# Patient Record
Sex: FEMALE | Race: BLACK OR AFRICAN AMERICAN | Employment: PART TIME | URBAN - METROPOLITAN AREA
[De-identification: names, ages, dates, MRNs, and addresses within clinical notes are randomized per-mention and may not be internally consistent; named-entity substitution may affect disease eponyms.]

---

## 2018-08-27 ENCOUNTER — HOSPITAL ENCOUNTER (OUTPATIENT)
Dept: PREADMISSION TESTING | Age: 48
Discharge: HOME | End: 2018-08-27
Payer: COMMERCIAL

## 2018-08-27 ENCOUNTER — ANESTHESIA EVENT (OUTPATIENT)
Dept: SURGERY | Age: 48
End: 2018-08-27
Payer: SUBSIDIZED

## 2018-08-27 LAB
ABO + RH BLD: NORMAL
ANION GAP SERPL CALC-SCNC: 7 MMOL/L (ref 3–18)
BASOPHILS # BLD: 0 K/UL (ref 0–0.1)
BASOPHILS NFR BLD: 0 % (ref 0–2)
BLOOD GROUP ANTIBODIES SERPL: NORMAL
BUN SERPL-MCNC: 12 MG/DL (ref 7–18)
BUN/CREAT SERPL: 14 (ref 12–20)
CALCIUM SERPL-MCNC: 8.7 MG/DL (ref 8.5–10.1)
CHLORIDE SERPL-SCNC: 105 MMOL/L (ref 100–108)
CO2 SERPL-SCNC: 27 MMOL/L (ref 21–32)
CREAT SERPL-MCNC: 0.83 MG/DL (ref 0.6–1.3)
DIFFERENTIAL METHOD BLD: ABNORMAL
EOSINOPHIL # BLD: 0.1 K/UL (ref 0–0.4)
EOSINOPHIL NFR BLD: 2 % (ref 0–5)
ERYTHROCYTE [DISTWIDTH] IN BLOOD BY AUTOMATED COUNT: 14.1 % (ref 11.6–14.5)
GLUCOSE SERPL-MCNC: 90 MG/DL (ref 74–99)
HCG SERPL QL: NEGATIVE
HCT VFR BLD AUTO: 36 % (ref 35–45)
HGB BLD-MCNC: 11.9 G/DL (ref 12–16)
LYMPHOCYTES # BLD: 1.8 K/UL (ref 0.9–3.6)
LYMPHOCYTES NFR BLD: 47 % (ref 21–52)
MCH RBC QN AUTO: 27.7 PG (ref 24–34)
MCHC RBC AUTO-ENTMCNC: 33.1 G/DL (ref 31–37)
MCV RBC AUTO: 83.9 FL (ref 74–97)
MONOCYTES # BLD: 0.4 K/UL (ref 0.05–1.2)
MONOCYTES NFR BLD: 10 % (ref 3–10)
NEUTS SEG # BLD: 1.6 K/UL (ref 1.8–8)
NEUTS SEG NFR BLD: 41 % (ref 40–73)
PLATELET # BLD AUTO: 297 K/UL (ref 135–420)
PMV BLD AUTO: 9.1 FL (ref 9.2–11.8)
POTASSIUM SERPL-SCNC: 4.7 MMOL/L (ref 3.5–5.5)
RBC # BLD AUTO: 4.29 M/UL (ref 4.2–5.3)
SODIUM SERPL-SCNC: 139 MMOL/L (ref 136–145)
SPECIMEN EXP DATE BLD: NORMAL
WBC # BLD AUTO: 3.9 K/UL (ref 4.6–13.2)

## 2018-08-27 PROCEDURE — 36415 COLL VENOUS BLD VENIPUNCTURE: CPT | Performed by: OBSTETRICS & GYNECOLOGY

## 2018-08-27 PROCEDURE — 84703 CHORIONIC GONADOTROPIN ASSAY: CPT | Performed by: OBSTETRICS & GYNECOLOGY

## 2018-08-27 PROCEDURE — 86900 BLOOD TYPING SEROLOGIC ABO: CPT | Performed by: OBSTETRICS & GYNECOLOGY

## 2018-08-27 PROCEDURE — 80048 BASIC METABOLIC PNL TOTAL CA: CPT | Performed by: OBSTETRICS & GYNECOLOGY

## 2018-08-27 PROCEDURE — 85025 COMPLETE CBC W/AUTO DIFF WBC: CPT | Performed by: OBSTETRICS & GYNECOLOGY

## 2018-08-28 ENCOUNTER — HOSPITAL ENCOUNTER (OUTPATIENT)
Age: 48
Setting detail: OUTPATIENT SURGERY
Discharge: HOME | End: 2018-08-28
Attending: OBSTETRICS & GYNECOLOGY | Admitting: OBSTETRICS & GYNECOLOGY
Payer: SUBSIDIZED

## 2018-08-28 ENCOUNTER — ANESTHESIA (OUTPATIENT)
Dept: SURGERY | Age: 48
End: 2018-08-28
Payer: SUBSIDIZED

## 2018-08-28 VITALS
BODY MASS INDEX: 32 KG/M2 | HEIGHT: 60 IN | RESPIRATION RATE: 16 BRPM | OXYGEN SATURATION: 98 % | HEART RATE: 72 BPM | DIASTOLIC BLOOD PRESSURE: 66 MMHG | SYSTOLIC BLOOD PRESSURE: 113 MMHG | WEIGHT: 163 LBS | TEMPERATURE: 98.2 F

## 2018-08-28 DIAGNOSIS — G89.18 POST-OP PAIN: Primary | ICD-10-CM

## 2018-08-28 PROCEDURE — 74011250636 HC RX REV CODE- 250/636

## 2018-08-28 PROCEDURE — 74011000258 HC RX REV CODE- 258: Performed by: OBSTETRICS & GYNECOLOGY

## 2018-08-28 PROCEDURE — 77030020362 HC SOL INJ STRL H2O 1000ML BG LF: Performed by: OBSTETRICS & GYNECOLOGY

## 2018-08-28 PROCEDURE — 76210000026 HC REC RM PH II 1 TO 1.5 HR: Performed by: OBSTETRICS & GYNECOLOGY

## 2018-08-28 PROCEDURE — 77030020782 HC GWN BAIR PAWS FLX 3M -B: Performed by: OBSTETRICS & GYNECOLOGY

## 2018-08-28 PROCEDURE — 77030018832 HC SOL IRR H20 ICUM -A: Performed by: OBSTETRICS & GYNECOLOGY

## 2018-08-28 PROCEDURE — 88305 TISSUE EXAM BY PATHOLOGIST: CPT | Performed by: OBSTETRICS & GYNECOLOGY

## 2018-08-28 PROCEDURE — 77030008683 HC TU ET CUF COVD -A: Performed by: ANESTHESIOLOGY

## 2018-08-28 PROCEDURE — 74011250636 HC RX REV CODE- 250/636: Performed by: OBSTETRICS & GYNECOLOGY

## 2018-08-28 PROCEDURE — 77030038020 HC MANFLD NEPTUNE STRY -B: Performed by: OBSTETRICS & GYNECOLOGY

## 2018-08-28 PROCEDURE — 88112 CYTOPATH CELL ENHANCE TECH: CPT | Performed by: OBSTETRICS & GYNECOLOGY

## 2018-08-28 PROCEDURE — 77030037892: Performed by: OBSTETRICS & GYNECOLOGY

## 2018-08-28 PROCEDURE — 77030018835 HC SOL IRR LR ICUM -A: Performed by: OBSTETRICS & GYNECOLOGY

## 2018-08-28 PROCEDURE — 76210000016 HC OR PH I REC 1 TO 1.5 HR: Performed by: OBSTETRICS & GYNECOLOGY

## 2018-08-28 PROCEDURE — 77030034849: Performed by: OBSTETRICS & GYNECOLOGY

## 2018-08-28 PROCEDURE — 74011250637 HC RX REV CODE- 250/637: Performed by: OBSTETRICS & GYNECOLOGY

## 2018-08-28 PROCEDURE — 77030032490 HC SLV COMPR SCD KNE COVD -B: Performed by: OBSTETRICS & GYNECOLOGY

## 2018-08-28 PROCEDURE — 77030031139 HC SUT VCRL2 J&J -A: Performed by: OBSTETRICS & GYNECOLOGY

## 2018-08-28 PROCEDURE — 77030034154 HC SHR COAG HARM ACE J&J -F: Performed by: OBSTETRICS & GYNECOLOGY

## 2018-08-28 PROCEDURE — 77030008603 HC TRCR ENDOSC EPATH J&J -C: Performed by: OBSTETRICS & GYNECOLOGY

## 2018-08-28 PROCEDURE — 77030039266 HC ADH SKN EXOFIN S2SG -A: Performed by: OBSTETRICS & GYNECOLOGY

## 2018-08-28 PROCEDURE — 77030018673: Performed by: OBSTETRICS & GYNECOLOGY

## 2018-08-28 PROCEDURE — 74011000250 HC RX REV CODE- 250: Performed by: OBSTETRICS & GYNECOLOGY

## 2018-08-28 PROCEDURE — 77030008477 HC STYL SATN SLP COVD -A: Performed by: ANESTHESIOLOGY

## 2018-08-28 PROCEDURE — 74011000250 HC RX REV CODE- 250

## 2018-08-28 PROCEDURE — 76010000131 HC OR TIME 2 TO 2.5 HR: Performed by: OBSTETRICS & GYNECOLOGY

## 2018-08-28 PROCEDURE — 76060000035 HC ANESTHESIA 2 TO 2.5 HR: Performed by: OBSTETRICS & GYNECOLOGY

## 2018-08-28 PROCEDURE — 88307 TISSUE EXAM BY PATHOLOGIST: CPT | Performed by: OBSTETRICS & GYNECOLOGY

## 2018-08-28 PROCEDURE — 77030002933 HC SUT MCRYL J&J -A: Performed by: OBSTETRICS & GYNECOLOGY

## 2018-08-28 PROCEDURE — 74011250636 HC RX REV CODE- 250/636: Performed by: ANESTHESIOLOGY

## 2018-08-28 PROCEDURE — 77030011294 HC FCPS BPLR MCR J&J -B: Performed by: OBSTETRICS & GYNECOLOGY

## 2018-08-28 RX ORDER — ALBUTEROL SULFATE 0.83 MG/ML
2.5 SOLUTION RESPIRATORY (INHALATION) AS NEEDED
Status: DISCONTINUED | OUTPATIENT
Start: 2018-08-28 | End: 2018-08-29 | Stop reason: HOSPADM

## 2018-08-28 RX ORDER — ESMOLOL HYDROCHLORIDE 10 MG/ML
INJECTION INTRAVENOUS AS NEEDED
Status: DISCONTINUED | OUTPATIENT
Start: 2018-08-28 | End: 2018-08-28 | Stop reason: HOSPADM

## 2018-08-28 RX ORDER — LIDOCAINE HYDROCHLORIDE 20 MG/ML
INJECTION, SOLUTION EPIDURAL; INFILTRATION; INTRACAUDAL; PERINEURAL AS NEEDED
Status: DISCONTINUED | OUTPATIENT
Start: 2018-08-28 | End: 2018-08-28 | Stop reason: HOSPADM

## 2018-08-28 RX ORDER — DEXAMETHASONE SODIUM PHOSPHATE 4 MG/ML
INJECTION, SOLUTION INTRA-ARTICULAR; INTRALESIONAL; INTRAMUSCULAR; INTRAVENOUS; SOFT TISSUE AS NEEDED
Status: DISCONTINUED | OUTPATIENT
Start: 2018-08-28 | End: 2018-08-28 | Stop reason: HOSPADM

## 2018-08-28 RX ORDER — SODIUM CHLORIDE 0.9 % (FLUSH) 0.9 %
5-10 SYRINGE (ML) INJECTION AS NEEDED
Status: DISCONTINUED | OUTPATIENT
Start: 2018-08-28 | End: 2018-08-29 | Stop reason: HOSPADM

## 2018-08-28 RX ORDER — FENTANYL CITRATE 50 UG/ML
25 INJECTION, SOLUTION INTRAMUSCULAR; INTRAVENOUS AS NEEDED
Status: DISCONTINUED | OUTPATIENT
Start: 2018-08-28 | End: 2018-08-29 | Stop reason: HOSPADM

## 2018-08-28 RX ORDER — BUPIVACAINE HYDROCHLORIDE 5 MG/ML
INJECTION, SOLUTION EPIDURAL; INTRACAUDAL AS NEEDED
Status: DISCONTINUED | OUTPATIENT
Start: 2018-08-28 | End: 2018-08-28 | Stop reason: HOSPADM

## 2018-08-28 RX ORDER — PHENAZOPYRIDINE HYDROCHLORIDE 100 MG/1
100 TABLET, FILM COATED ORAL ONCE
Status: COMPLETED | OUTPATIENT
Start: 2018-08-28 | End: 2018-08-28

## 2018-08-28 RX ORDER — NEOSTIGMINE METHYLSULFATE 5 MG/5 ML
SYRINGE (ML) INTRAVENOUS AS NEEDED
Status: DISCONTINUED | OUTPATIENT
Start: 2018-08-28 | End: 2018-08-28 | Stop reason: HOSPADM

## 2018-08-28 RX ORDER — NALOXONE HYDROCHLORIDE 0.4 MG/ML
0.2 INJECTION, SOLUTION INTRAMUSCULAR; INTRAVENOUS; SUBCUTANEOUS AS NEEDED
Status: DISCONTINUED | OUTPATIENT
Start: 2018-08-28 | End: 2018-08-29 | Stop reason: HOSPADM

## 2018-08-28 RX ORDER — SODIUM CHLORIDE, SODIUM LACTATE, POTASSIUM CHLORIDE, CALCIUM CHLORIDE 600; 310; 30; 20 MG/100ML; MG/100ML; MG/100ML; MG/100ML
125 INJECTION, SOLUTION INTRAVENOUS CONTINUOUS
Status: DISCONTINUED | OUTPATIENT
Start: 2018-08-28 | End: 2018-08-29 | Stop reason: HOSPADM

## 2018-08-28 RX ORDER — ONDANSETRON 2 MG/ML
INJECTION INTRAMUSCULAR; INTRAVENOUS AS NEEDED
Status: DISCONTINUED | OUTPATIENT
Start: 2018-08-28 | End: 2018-08-28 | Stop reason: HOSPADM

## 2018-08-28 RX ORDER — DIPHENHYDRAMINE HYDROCHLORIDE 50 MG/ML
12.5 INJECTION, SOLUTION INTRAMUSCULAR; INTRAVENOUS
Status: DISCONTINUED | OUTPATIENT
Start: 2018-08-28 | End: 2018-08-29 | Stop reason: HOSPADM

## 2018-08-28 RX ORDER — MAGNESIUM SULFATE 100 %
4 CRYSTALS MISCELLANEOUS AS NEEDED
Status: DISCONTINUED | OUTPATIENT
Start: 2018-08-28 | End: 2018-08-29 | Stop reason: HOSPADM

## 2018-08-28 RX ORDER — GLYCOPYRROLATE 0.2 MG/ML
INJECTION INTRAMUSCULAR; INTRAVENOUS AS NEEDED
Status: DISCONTINUED | OUTPATIENT
Start: 2018-08-28 | End: 2018-08-28 | Stop reason: HOSPADM

## 2018-08-28 RX ORDER — FENTANYL CITRATE 50 UG/ML
INJECTION, SOLUTION INTRAMUSCULAR; INTRAVENOUS AS NEEDED
Status: DISCONTINUED | OUTPATIENT
Start: 2018-08-28 | End: 2018-08-28 | Stop reason: HOSPADM

## 2018-08-28 RX ORDER — KETOROLAC TROMETHAMINE 10 MG/1
10 TABLET, FILM COATED ORAL
Qty: 20 TAB | Refills: 0 | Status: SHIPPED | OUTPATIENT
Start: 2018-08-28 | End: 2022-04-01

## 2018-08-28 RX ORDER — KETOROLAC TROMETHAMINE 30 MG/ML
30 INJECTION, SOLUTION INTRAMUSCULAR; INTRAVENOUS
Status: COMPLETED | OUTPATIENT
Start: 2018-08-28 | End: 2018-08-28

## 2018-08-28 RX ORDER — PROPOFOL 10 MG/ML
INJECTION, EMULSION INTRAVENOUS AS NEEDED
Status: DISCONTINUED | OUTPATIENT
Start: 2018-08-28 | End: 2018-08-28 | Stop reason: HOSPADM

## 2018-08-28 RX ORDER — MIDAZOLAM HYDROCHLORIDE 1 MG/ML
INJECTION, SOLUTION INTRAMUSCULAR; INTRAVENOUS AS NEEDED
Status: DISCONTINUED | OUTPATIENT
Start: 2018-08-28 | End: 2018-08-28 | Stop reason: HOSPADM

## 2018-08-28 RX ORDER — FLUMAZENIL 0.1 MG/ML
0.2 INJECTION INTRAVENOUS
Status: DISCONTINUED | OUTPATIENT
Start: 2018-08-28 | End: 2018-08-29 | Stop reason: HOSPADM

## 2018-08-28 RX ORDER — ROCURONIUM BROMIDE 10 MG/ML
INJECTION, SOLUTION INTRAVENOUS AS NEEDED
Status: DISCONTINUED | OUTPATIENT
Start: 2018-08-28 | End: 2018-08-28 | Stop reason: HOSPADM

## 2018-08-28 RX ORDER — DEXTROSE 50 % IN WATER (D50W) INTRAVENOUS SYRINGE
25-50 AS NEEDED
Status: DISCONTINUED | OUTPATIENT
Start: 2018-08-28 | End: 2018-08-29 | Stop reason: HOSPADM

## 2018-08-28 RX ORDER — OXYCODONE AND ACETAMINOPHEN 5; 325 MG/1; MG/1
1 TABLET ORAL
Qty: 40 TAB | Refills: 0 | Status: SHIPPED | OUTPATIENT
Start: 2018-08-28 | End: 2022-04-01

## 2018-08-28 RX ORDER — HYDROMORPHONE HYDROCHLORIDE 2 MG/ML
0.5 INJECTION, SOLUTION INTRAMUSCULAR; INTRAVENOUS; SUBCUTANEOUS
Status: DISCONTINUED | OUTPATIENT
Start: 2018-08-28 | End: 2018-08-29 | Stop reason: HOSPADM

## 2018-08-28 RX ORDER — SODIUM CHLORIDE, SODIUM LACTATE, POTASSIUM CHLORIDE, CALCIUM CHLORIDE 600; 310; 30; 20 MG/100ML; MG/100ML; MG/100ML; MG/100ML
1000 INJECTION, SOLUTION INTRAVENOUS CONTINUOUS
Status: DISCONTINUED | OUTPATIENT
Start: 2018-08-28 | End: 2018-08-29 | Stop reason: HOSPADM

## 2018-08-28 RX ADMIN — FENTANYL CITRATE 25 MCG: 50 INJECTION, SOLUTION INTRAMUSCULAR; INTRAVENOUS at 19:01

## 2018-08-28 RX ADMIN — FENTANYL CITRATE 25 MCG: 50 INJECTION, SOLUTION INTRAMUSCULAR; INTRAVENOUS at 19:21

## 2018-08-28 RX ADMIN — FENTANYL CITRATE 50 MCG: 50 INJECTION, SOLUTION INTRAMUSCULAR; INTRAVENOUS at 18:13

## 2018-08-28 RX ADMIN — GLYCOPYRROLATE 0.4 MG: 0.2 INJECTION INTRAMUSCULAR; INTRAVENOUS at 18:10

## 2018-08-28 RX ADMIN — ROCURONIUM BROMIDE 50 MG: 10 INJECTION, SOLUTION INTRAVENOUS at 16:15

## 2018-08-28 RX ADMIN — FENTANYL CITRATE 250 MCG: 50 INJECTION, SOLUTION INTRAMUSCULAR; INTRAVENOUS at 16:15

## 2018-08-28 RX ADMIN — SODIUM CHLORIDE, SODIUM LACTATE, POTASSIUM CHLORIDE, AND CALCIUM CHLORIDE: 600; 310; 30; 20 INJECTION, SOLUTION INTRAVENOUS at 17:21

## 2018-08-28 RX ADMIN — ESMOLOL HYDROCHLORIDE 40 MG: 10 INJECTION INTRAVENOUS at 16:22

## 2018-08-28 RX ADMIN — CEFOXITIN 2 G: 2 INJECTION, POWDER, FOR SOLUTION INTRAVENOUS at 16:08

## 2018-08-28 RX ADMIN — DEXAMETHASONE SODIUM PHOSPHATE 8 MG: 4 INJECTION, SOLUTION INTRA-ARTICULAR; INTRALESIONAL; INTRAMUSCULAR; INTRAVENOUS; SOFT TISSUE at 16:24

## 2018-08-28 RX ADMIN — SODIUM CHLORIDE, SODIUM LACTATE, POTASSIUM CHLORIDE, AND CALCIUM CHLORIDE 125 ML/HR: 600; 310; 30; 20 INJECTION, SOLUTION INTRAVENOUS at 19:29

## 2018-08-28 RX ADMIN — PHENAZOPYRIDINE HYDROCHLORIDE 100 MG: 100 TABLET ORAL at 11:38

## 2018-08-28 RX ADMIN — SODIUM CHLORIDE, SODIUM LACTATE, POTASSIUM CHLORIDE, AND CALCIUM CHLORIDE 125 ML/HR: 600; 310; 30; 20 INJECTION, SOLUTION INTRAVENOUS at 11:46

## 2018-08-28 RX ADMIN — KETOROLAC TROMETHAMINE 30 MG: 30 INJECTION, SOLUTION INTRAMUSCULAR at 19:25

## 2018-08-28 RX ADMIN — FENTANYL CITRATE 25 MCG: 50 INJECTION, SOLUTION INTRAMUSCULAR; INTRAVENOUS at 19:30

## 2018-08-28 RX ADMIN — MIDAZOLAM HYDROCHLORIDE 2 MG: 1 INJECTION, SOLUTION INTRAMUSCULAR; INTRAVENOUS at 16:08

## 2018-08-28 RX ADMIN — Medication 3 MG: at 18:10

## 2018-08-28 RX ADMIN — ONDANSETRON 4 MG: 2 INJECTION INTRAMUSCULAR; INTRAVENOUS at 17:15

## 2018-08-28 RX ADMIN — LIDOCAINE HYDROCHLORIDE 60 MG: 20 INJECTION, SOLUTION EPIDURAL; INFILTRATION; INTRACAUDAL; PERINEURAL at 16:15

## 2018-08-28 RX ADMIN — PROPOFOL 200 MG: 10 INJECTION, EMULSION INTRAVENOUS at 16:15

## 2018-08-28 RX ADMIN — FENTANYL CITRATE 50 MCG: 50 INJECTION, SOLUTION INTRAMUSCULAR; INTRAVENOUS at 18:15

## 2018-08-28 RX ADMIN — FENTANYL CITRATE 25 MCG: 50 INJECTION, SOLUTION INTRAMUSCULAR; INTRAVENOUS at 18:55

## 2018-08-28 NOTE — PERIOP NOTES
Primary Nurse Marielena Benoit RN and Taylor Malave RN performed a dual skin assessment on this patient     Reviewed PTA medication list with patient/caregiver and patient/caregiver denies any additional medications.  Patient admits to having a responsible adult care for them for at least 24 hours after surgery.

## 2018-08-28 NOTE — ANESTHESIA PREPROCEDURE EVALUATION
Anesthetic History No history of anesthetic complications Review of Systems / Medical History Patient summary reviewed, nursing notes reviewed and pertinent labs reviewed Pulmonary Within defined limits Neuro/Psych Within defined limits Cardiovascular Within defined limits Exercise tolerance: >4 METS 
  
GI/Hepatic/Renal 
  
GERD: well controlled Endo/Other Within defined limits Other Findings Physical Exam 
 
Airway Mallampati: II 
TM Distance: 4 - 6 cm Neck ROM: normal range of motion Mouth opening: Normal 
 
 Cardiovascular Regular rate and rhythm,  S1 and S2 normal,  no murmur, click, rub, or gallop Dental 
No notable dental hx Pulmonary Breath sounds clear to auscultation Abdominal 
GI exam deferred Other Findings Anesthetic Plan ASA: 2 Anesthesia type: general 
 
 
 
 
Induction: Intravenous Anesthetic plan and risks discussed with: Patient

## 2018-08-28 NOTE — PERIOP NOTES
TRANSFER - IN REPORT:    Verbal report received from  [address], OR nurse (name) on Riaz Mediate  being received from OR (unit) for routine progression of care      Report consisted of patients Situation, Background, Assessment and   Recommendations(SBAR). Information from the following report(s) OR Summary, Procedure Summary, Intake/Output and MAR was reviewed with the receiving nurse. Opportunity for questions and clarification was provided. Assessment completed upon patients arrival to unit and care assumed.

## 2018-08-28 NOTE — INTERVAL H&P NOTE
H&P Update:  Ashley Crump was seen and examined. History and physical has been reviewed. The patient has been examined.  There have been no significant clinical changes since the completion of the originally dated History and Physical.    Signed By: Bonnie Murillo MD     August 28, 2018 12:03 PM

## 2018-08-28 NOTE — BRIEF OP NOTE
BRIEF OPERATIVE NOTE    Date of Procedure: 8/28/2018   Preoperative Diagnosis: LEFT OVARIAN CYST, LEFT LOWER QUADRANT PAIN  Postoperative Diagnosis: LEFT OVARIAN NEOPLASM, LEFT LOWER QUADRANT PAIN    Procedure(s):  1. LAPAROSCOPIC LEFT OOPHORECTOMY INCLUDING OVARIAN NEOPLASM, EXTENSIVE LYSIS OF ADHESIONS  2. CYSTOSCOPY     Surgeon(s) and Role:     * Radha Bolton MD - Primary         Surgical Assistant: [address] [place] [address]    Surgical Staff:  Circ-1: Cedrick Rader RN  Circ-Relief: Karina Black  Scrub Tech-1: Tayo Marquez  Scrub Tech-2: Des Gomez  Scrub RN-1: Marco A Loredo RN  Event Time In   Incision Start 1643   Incision Close 1825     Anesthesia: General   Anesthesiologist: Patsy Pratt MD  CRNA: Ofelia Horton CRNA; Uyen Spann CRNA  Estimated Blood Loss: 20cc  Specimens:   ID Type Source Tests Collected by Time Destination   1 : LEFT OVARY AND CYST Preservative Ovarian Cyst, Left  Radha Bolton MD 8/28/2018 1744 Pathology   1 : PERITONEAL WASHINGS Fresh Peritoneal Fluid  Radha Bolton MD 8/28/2018 1653 Cytology      Findings: 8cm predominantly cystic left ovarian neoplasm densely adherent to the left retroperitoneal structures including left ureter and also densely adherent to the vaginal cuff and left pelvic sidewall. Filmy adhesions of the sigmoid colon to the mass. Normal appearing right ovary.   Complications: none  Implants: * No implants in log *   Radha Bolton MD  6:45 PM

## 2018-08-28 NOTE — IP AVS SNAPSHOT
[address] [place] [address] 
[phone] Patient: Rose Freeman MRN: [id number] :[id number] About your hospitalization You were admitted on:  2018 You last received care in the:  [facility] PHASE 2 RECOVERY You were discharged on:  2018 Why you were hospitalized Your primary diagnosis was:  Not on File Follow-up Information Follow up With Details Comments Contact Info Moisés Griffiths MD   [place] [address] 
[phone] Grace Bolton MD Schedule an appointment as soon as possible for a visit in 10 day(s)   [place] [address] 
[phone] Discharge Orders None A check lila indicates which time of day the medication should be taken. My Medications START taking these medications Instructions Each Dose to Equal  
 Morning Noon Evening Bedtime  
 ketorolac 10 mg tablet Commonly known as:  TORADOL Your last dose was: Your next dose is: Take 1 Tab by mouth every six (6) hours as needed for Pain. Indications: Postoperative Acute Pain 10 mg  
    
   
   
   
  
 oxyCODONE-acetaminophen 5-325 mg per tablet Commonly known as:  PERCOCET Your last dose was: Your next dose is: Take 1 Tab by mouth every four (4) hours as needed for Pain. Max Daily Amount: 6 Tabs. 1 Tab CONTINUE taking these medications Instructions Each Dose to Equal  
 Morning Noon Evening Bedtime  
 fluconazole 150 mg tablet Commonly known as:  DIFLUCAN Your last dose was: Your next dose is: Take 1 tablet today and 1 tablet in 3 days. raNITIdine 150 mg tablet Commonly known as:  ZANTAC Your last dose was: Your next dose is: Take 150 mg by mouth nightly.   
 150 mg  
 Where to Get Your Medications Information on where to get these meds will be given to you by the nurse or doctor. ! Ask your nurse or doctor about these medications  
  ketorolac 10 mg tablet  
 oxyCODONE-acetaminophen 5-325 mg per tablet Opioid Education Prescription Opioids: What You Need to Know: 
 
Prescription opioids can be used to help relieve moderate-to-severe pain and are often prescribed following a surgery or injury, or for certain health conditions. These medications can be an important part of treatment but also come with serious risks. Opioids are strong pain medicines. Examples include hydrocodone, oxycodone, fentanyl, and morphine. Heroin is an example of an illegal opioid. It is important to work with your health care provider to make sure you are getting the safest, most effective care. WHAT ARE THE RISKS AND SIDE EFFECTS OF OPIOID USE? Prescription opioids carry serious risks of addiction and overdose, especially with prolonged use. An opioid overdose, often marked by slow breathing, can cause sudden death. The use of prescription opioids can have a number of side effects as well, even when taken as directed. · Tolerance-meaning you might need to take more of a medication for the same pain relief · Physical dependence-meaning you have symptoms of withdrawal when the medication is stopped. Withdrawal symptoms can include nausea, sweating, chills, diarrhea, stomach cramps, and muscle aches. Withdrawal can last up to several weeks, depending on which drug you took and how long you took it. · Increased sensitivity to pain · Constipation · Nausea, vomiting, and dry mouth · Sleepiness and dizziness · Confusion · Depression · Low levels of testosterone that can result in lower sex drive, energy, and strength · Itching and sweating RISKS ARE GREATER WITH:      
· History of drug misuse, substance use disorder, or overdose · Mental health conditions (such as depression or anxiety) · Sleep apnea · Older age (72 years or older) · Pregnancy Avoid alcohol while taking prescription opioids. Also, unless specifically advised by your health care provider, medications to avoid include: · Benzodiazepines (such as Xanax or Valium) · Muscle relaxants (such as Soma or Flexeril) · Hypnotics (such as Ambien or Lunesta) · Other prescription opioids KNOW YOUR OPTIONS Talk to your health care provider about ways to manage your pain that don't involve prescription opioids. Some of these options may actually work better and have fewer risks and side effects. Options may include: 
· Pain relievers such as acetaminophen, ibuprofen, and naproxen · Some medications that are also used for depression or seizures · Physical therapy and exercise · Counseling to help patients learn how to cope better with triggers of pain and stress. · Application of heat or cold compress · Massage therapy · Relaxation techniques Be Informed Make sure you know the name of your medication, how much and how often to take it, and its potential risks & side effects. IF YOU ARE PRESCRIBED OPIOIDS FOR PAIN: 
· Never take opioids in greater amounts or more often than prescribed. Remember the goal is not to be pain-free but to manage your pain at a tolerable level. · Follow up with your primary care provider to: · Work together to create a plan on how to manage your pain. · Talk about ways to help manage your pain that don't involve prescription opioids. · Talk about any and all concerns and side effects. · Help prevent misuse and abuse. · Never sell or share prescription opioids · Help prevent misuse and abuse. · Store prescription opioids in a secure place and out of reach of others (this may include visitors, children, friends, and family).  
· Safely dispose of unused/unwanted prescription opioids: Find your community drug take-back program or your pharmacy mail-back program, or flush them down the toilet, following guidance from the Food and Drug Administration (www.fda.gov/Drugs/ResourcesForYou). · Visit www.cdc.gov/drugoverdose to learn about the risks of opioid abuse and overdose. · If you believe you may be struggling with addiction, tell your health care provider and ask for guidance or call [facility] at 5-708-558-OGAI. Discharge Instructions Laparoscopic Oophorectomy: What to Expect at [facility] Your Recovery After surgery to remove one or both ovaries, you may feel some pain in your belly for a few days. Your belly may also be swollen. You may have a change in your bowel movements for a few days. It's normal to also have some shoulder or back pain. This is caused by the gas your doctor put in your belly to help see your organs better. To help with pain, your doctor will prescribe medicines. You may need about 1 week to fully recover. It's important not to lift anything heavy for about 1 week. You can ask your doctor when it's okay to have sex. This care sheet gives you a general idea about how long it will take for you to recover. But each person recovers at a different pace. Follow the steps below to get better as quickly as possible. How can you care for yourself at home? Activity 
  · Rest when you feel tired.  
  · Be active. Walking is a good choice.  
  · Allow your body to heal. Don't move quickly or lift anything heavy until you are feeling better.  
  · Hold a pillow over your incisions when you cough or take deep breaths. This will support your belly and may help to decrease your pain.  
  · Do breathing exercises at home as instructed by your doctor. This will help prevent pneumonia. Diet 
  · You can eat your normal diet.  If your stomach is upset, try bland, low-fat foods like plain rice, broiled chicken, toast, and yogurt.  
  · Drink plenty of fluids (unless your doctor tells you not to).   · If your bowel movements are not regular right after surgery, try to avoid constipation and straining. Drink plenty of water. Your doctor may suggest fiber, a stool softener, or a mild laxative. Medicines 
  · Your doctor will tell you if and when you can restart your medicines. He or she will also give you instructions about taking any new medicines.  
  · If you take blood thinners, such as warfarin (Coumadin), clopidogrel (Plavix), or aspirin, be sure to talk to your doctor. He or she will tell you if and when to start taking those medicines again. Make sure that you understand exactly what your doctor wants you to do.  
  · Be safe with medicines. Read and follow all instructions on the label. ¨ If the doctor gave you a prescription medicine for pain, take it as prescribed. ¨ If you are not taking a prescription pain medicine, ask your doctor if you can take an over-the-counter medicine. Incision care 
  · If you have strips of tape on the cut (incision) the doctor made, leave the tape on for a week or until it falls off.  
  · Wash the area daily with warm, soapy water, and pat it dry. Don't use hydrogen peroxide or alcohol. They can slow healing.  
  · You may cover the area with a gauze bandage if it oozes fluid or rubs against clothing.  
  · Change the bandage every day.  
  · Keep the area clean and dry. Other instructions 
  · Wear loose, comfortable clothing. For a few weeks, avoid anything that puts pressure on your belly.  
  · You may want to use a heating pad on your belly to help with pain. Follow-up care is a key part of your treatment and safety. Be sure to make and go to all appointments, and call your doctor if you are having problems. It's also a good idea to know your test results and keep a list of the medicines you take. When should you call for help? Call 911 anytime you think you may need emergency care. For example, call if: 
  · You passed out (lost consciousness).  
  · You have chest pain, are short of breath, or cough up blood.  
 Call your doctor now or seek immediate medical care if: 
  · You have pain that does not get better after you take pain medicine.  
  · You cannot pass stools or gas.  
  · You have vaginal discharge that has increased in amount or smells bad.  
  · You are sick to your stomach or cannot drink fluids.  
  · You have loose stitches, or your incision comes open.  
  · Bright red blood has soaked through the bandage over your incision.  
  · You have signs of infection, such as: 
¨ Increased pain, swelling, warmth, or redness. ¨ Red streaks leading from the incision. ¨ Pus draining from the incision. ¨ A fever.  
  · You have bright red vaginal bleeding that soaks one or more pads in an hour, or you have large clots.  
  · You have signs of a blood clot in your leg (called a deep vein thrombosis), such as: 
¨ Pain in your calf, back of the knee, thigh, or groin. ¨ Redness and swelling in your leg.  
 Watch closely for changes in your health, and be sure to contact your doctor if you have any problems. Where can you learn more? Go to http://lamberto-jeff.info/. Enter D091 in the search box to learn more about \"Laparoscopic Oophorectomy: What to Expect at Home. \" Current as of: Randa 10, 2017 Content Version: 11.7 © 9120-7454 Healthwise, Incorporated. Care instructions adapted under license by [employer] (which disclaims liability or warranty for this information). If you have questions about a medical condition or this instruction, always ask your healthcare professional. [place] [address] any warranty or liability for your use of this information. DISCHARGE SUMMARY from Nurse PATIENT INSTRUCTIONS: 
 
 
F-face looks uneven A-arms unable to move or move unevenly S-speech slurred or non-existent T-time-call 911 as soon as signs and symptoms begin-DO NOT go Back to bed or wait to see if you get better-TIME IS BRAIN. Warning Signs of HEART ATTACK Call 911 if you have these symptoms: 
? Chest discomfort. Most heart attacks involve discomfort in the center of the chest that lasts more than a few minutes, or that goes away and comes back. It can feel like uncomfortable pressure, squeezing, fullness, or pain. ? Discomfort in other areas of the upper body. Symptoms can include pain or discomfort in one or both arms, the back, neck, jaw, or stomach. ? Shortness of breath with or without chest discomfort. ? Other signs may include breaking out in a cold sweat, nausea, or lightheadedness. Don't wait more than five minutes to call [address]! Fast action can save your life. Calling 911 is almost always the fastest way to get lifesaving treatment. Emergency Medical Services staff can begin treatment when they arrive  up to an hour sooner than if someone gets to the hospital by car. The discharge information has been reviewed with the patient and caregiver. The patient and caregiver verbalized understanding. Discharge medications reviewed with the patient and caregiver and appropriate educational materials and side effects teaching were provided. Patient armband removed and shredded 
___________________________________________________________________________________________________________________________________ Introducing [facility]! [facility] introduces BitGymt patient portal. Now you can access parts of your medical record, email your doctor's office, and request medication refills online. 1. In your internet browser, go to [email or web address] 2. Click on the First Time User? Click Here link in the Sign In box. You will see the New Member Sign Up page. 3. Enter your Cubby Access Code exactly as it appears below. You will not need to use this code after youve completed the sign-up process. If you do not sign up before the expiration date, you must request a new code. · Cubby Access Code: [email or web address] Expires: 10/23/2018  4:57 PM 
 
4. Enter the last four digits of your Social Security Number (xxxx) and Date of Birth (mm/dd/yyyy) as indicated and click Submit. You will be taken to the next sign-up page. 5. Create a Cubby ID. This will be your Cubby login ID and cannot be changed, so think of one that is secure and easy to remember. 6. Create a Cubby password. You can change your password at any time. 7. Enter your Password Reset Question and Answer. This can be used at a later time if you forget your password. 8. Enter your e-mail address. You will receive e-mail notification when new information is available in [address] 9. Click Sign Up. You can now view and download portions of your medical record. 10. Click the Download Summary menu link to download a portable copy of your medical information. If you have questions, please visit the Frequently Asked Questions section of the Cubby website. Remember, Cubby is NOT to be used for urgent needs. For medical emergencies, dial 911. Now available from your iPhone and Android! Introducing Geovanni Mcwilliams As a [facility] patient, I wanted to make you aware of our electronic visit tool called Geovanni Mcwilliams. [facility] 24/7 allows you to connect within minutes with a medical provider 24 hours a day, seven days a week via a mobile device or tablet or logging into a secure website from your computer. You can access Enlighted from anywhere in the [place]. A virtual visit might be right for you when you have a simple condition and feel like you just dont want to get out of bed, or cant get away from work for an appointment, when your regular [facility] provider is not available (evenings, weekends or holidays), or when youre out of town and need minor care. Electronic visits cost only $49 and if the [employer] 24/Tower[employer] provider determines a prescription is needed to treat your condition, one can be electronically transmitted to a nearby pharmacy*. Please take a moment to enroll today if you have not already done so. The enrollment process is free and takes just a few minutes. To enroll, please download the GeoSentric tiffanie to your tablet or phone, or visit [email or web address] to enroll on your computer. And, as an [address] [place] [address] patient with a DabKick account, the results of your visits will be scanned into your electronic medical record and your primary care provider will be able to view the scanned results. We urge you to continue to see your regular [facility] provider for your ongoing medical care. And while your primary care provider may not be the one available when you seek a Geovanni Mcwilliams virtual visit, the peace of mind you get from getting a real diagnosis real time can be priceless. For more information on Geovanni FreeDriveclaudiofin, view our Frequently Asked Questions (FAQs) at [email or web address]. Sincerely, 
 
Azeb Sparrow MD 
Chief Medical Officer Marko Mccallum *:  certain medications cannot be prescribed via SOS Online Backupkana Providers Seen During Your Hospitalization Provider Specialty Primary office phone Bob Agarwal MD Gynecologic Oncology [phone] Your Primary Care Physician (PCP) Primary Care Physician Office Phone Office Fax [address] [phone] You are allergic to the following No active allergies Recent Documentation Height Weight BMI OB Status Smoking Status 1.524 m 73.9 kg 31.83 kg/m2 Hysterectomy Never Smoker Emergency Contacts Name Discharge Info Relation Home Work Mobile Katy Alva CAREGIVER [3] Spouse [3] [phone] Patient Belongings The following personal items are in your possession at time of discharge: 
  Dental Appliances: None  Visual Aid: Glasses      Home Medications: None   Jewelry: None  Clothing: Pants, Shirt, Footwear, Undergarments (Beth Mustafa #3)    Other Valuables: Cell Phone, Purse (with  Breanna Benson) Please provide this summary of care documentation to your next provider. Signatures-by signing, you are acknowledging that this After Visit Summary has been reviewed with you and you have received a copy. Patient Signature:  ____________________________________________________________ Date:  ____________________________________________________________  
  
Marie Bustamante Provider Signature:  ____________________________________________________________ Date:  ____________________________________________________________

## 2018-08-29 NOTE — DISCHARGE INSTRUCTIONS
Laparoscopic Oophorectomy: What to Expect at [facility]    After surgery to remove one or both ovaries, you may feel some pain in your belly for a few days. Your belly may also be swollen. You may have a change in your bowel movements for a few days. It's normal to also have some shoulder or back pain. This is caused by the gas your doctor put in your belly to help see your organs better. To help with pain, your doctor will prescribe medicines. You may need about 1 week to fully recover. It's important not to lift anything heavy for about 1 week. You can ask your doctor when it's okay to have sex. This care sheet gives you a general idea about how long it will take for you to recover. But each person recovers at a different pace. Follow the steps below to get better as quickly as possible. How can you care for yourself at home? Activity    · Rest when you feel tired.     · Be active. Walking is a good choice.     · Allow your body to heal. Don't move quickly or lift anything heavy until you are feeling better.     · Hold a pillow over your incisions when you cough or take deep breaths. This will support your belly and may help to decrease your pain.     · Do breathing exercises at home as instructed by your doctor. This will help prevent pneumonia. Diet    · You can eat your normal diet. If your stomach is upset, try bland, low-fat foods like plain rice, broiled chicken, toast, and yogurt.     · Drink plenty of fluids (unless your doctor tells you not to).   · If your bowel movements are not regular right after surgery, try to avoid constipation and straining. Drink plenty of water. Your doctor may suggest fiber, a stool softener, or a mild laxative. Medicines    · Your doctor will tell you if and when you can restart your medicines.  He or she will also give you instructions about taking any new medicines.     · If you take blood thinners, such as warfarin (Coumadin), clopidogrel (Plavix), or aspirin, be sure to talk to your doctor. He or she will tell you if and when to start taking those medicines again. Make sure that you understand exactly what your doctor wants you to do.     · Be safe with medicines. Read and follow all instructions on the label. ¨ If the doctor gave you a prescription medicine for pain, take it as prescribed. ¨ If you are not taking a prescription pain medicine, ask your doctor if you can take an over-the-counter medicine. Incision care    · If you have strips of tape on the cut (incision) the doctor made, leave the tape on for a week or until it falls off.     · Wash the area daily with warm, soapy water, and pat it dry. Don't use hydrogen peroxide or alcohol. They can slow healing.     · You may cover the area with a gauze bandage if it oozes fluid or rubs against clothing.     · Change the bandage every day.     · Keep the area clean and dry. Other instructions    · Wear loose, comfortable clothing. For a few weeks, avoid anything that puts pressure on your belly.     · You may want to use a heating pad on your belly to help with pain. Follow-up care is a key part of your treatment and safety. Be sure to make and go to all appointments, and call your doctor if you are having problems. It's also a good idea to know your test results and keep a list of the medicines you take. When should you call for help? Call 911 anytime you think you may need emergency care.  For example, call if:    · You passed out (lost consciousness).     · You have chest pain, are short of breath, or cough up blood.    Call your doctor now or seek immediate medical care if:    · You have pain that does not get better after you take pain medicine.     · You cannot pass stools or gas.     · You have vaginal discharge that has increased in amount or smells bad.     · You are sick to your stomach or cannot drink fluids.     · You have loose stitches, or your incision comes open.     · Bright red blood has soaked through the bandage over your incision.     · You have signs of infection, such as:  ¨ Increased pain, swelling, warmth, or redness. ¨ Red streaks leading from the incision. ¨ Pus draining from the incision. ¨ A fever.     · You have bright red vaginal bleeding that soaks one or more pads in an hour, or you have large clots.     · You have signs of a blood clot in your leg (called a deep vein thrombosis), such as:  ¨ Pain in your calf, back of the knee, thigh, or groin. ¨ Redness and swelling in your leg.    Watch closely for changes in your health, and be sure to contact your doctor if you have any problems. Where can you learn more? Go to http://lamberto-jeff.info/. Enter T532 in the search box to learn more about \"Laparoscopic Oophorectomy: What to Expect at Home. \"  Current as of: Randa 10, 2017  Content Version: 11.7  © 2023-3023 [employer]. Care instructions adapted under license by [employer] (which disclaims liability or warranty for this information). If you have questions about a medical condition or this instruction, always ask your healthcare professional. [place] [address] any warranty or liability for your use of this information. DISCHARGE SUMMARY from Nurse    PATIENT INSTRUCTIONS:    After general anesthesia or intravenous sedation, for 24 hours or while taking prescription Narcotics:  · Limit your activities  · Do not drive and operate hazardous machinery  · Do not make important personal or business decisions  · Do  not drink alcoholic beverages  · If you have not urinated within 8 hours after discharge, please contact your surgeon on call.     Report the following to your surgeon:  · Excessive pain, swelling, redness or odor of or around the surgical area  · Temperature over 100.5  · Nausea and vomiting lasting longer than 4 hours or if unable to take medications  · Any signs of decreased circulation or nerve impairment to extremity: change in color, persistent  numbness, tingling, coldness or increase pain  · Any questions    What to do at Home:  Recommended activity: Ambulate in house,     If you experience any of the following symptoms chills, fever,nausea and vomiting, excessive bleeding at site, pain that is not relieved by pain medication medication  please follow up with Dr. Kayla Maldonado. *  Please give a list of your current medications to your Primary Care Provider. *  Please update this list whenever your medications are discontinued, doses are      changed, or new medications (including over-the-counter products) are added. *  Please carry medication information at all times in case of emergency situations. These are general instructions for a healthy lifestyle:    No smoking/ No tobacco products/ Avoid exposure to second hand smoke  Surgeon General's Warning:  Quitting smoking now greatly reduces serious risk to your health. Obesity, smoking, and sedentary lifestyle greatly increases your risk for illness    A healthy diet, regular physical exercise & weight monitoring are important for maintaining a healthy lifestyle    You may be retaining fluid if you have a history of heart failure or if you experience any of the following symptoms:  Weight gain of 3 pounds or more overnight or 5 pounds in a week, increased swelling in our hands or feet or shortness of breath while lying flat in bed. Please call your doctor as soon as you notice any of these symptoms; do not wait until your next office visit. Recognize signs and symptoms of STROKE:    F-face looks uneven    A-arms unable to move or move unevenly    S-speech slurred or non-existent    T-time-call 911 as soon as signs and symptoms begin-DO NOT go       Back to bed or wait to see if you get better-TIME IS BRAIN. Warning Signs of HEART ATTACK     Call 911 if you have these symptoms:   Chest discomfort.  Most heart attacks involve discomfort in the center of the chest that lasts more than a few minutes, or that goes away and comes back. It can feel like uncomfortable pressure, squeezing, fullness, or pain.  Discomfort in other areas of the upper body. Symptoms can include pain or discomfort in one or both arms, the back, neck, jaw, or stomach.  Shortness of breath with or without chest discomfort.  Other signs may include breaking out in a cold sweat, nausea, or lightheadedness. Don't wait more than five minutes to call 911 - MINUTES MATTER! Fast action can save your life. Calling 911 is almost always the fastest way to get lifesaving treatment. Emergency Medical Services staff can begin treatment when they arrive -- up to an hour sooner than if someone gets to the hospital by car. The discharge information has been reviewed with the patient and caregiver. The patient and caregiver verbalized understanding. Discharge medications reviewed with the patient and caregiver and appropriate educational materials and side effects teaching were provided.     Patient armband removed and shredded  ___________________________________________________________________________________________________________________________________

## 2018-08-29 NOTE — OP NOTES
[facility] [place]  OPERATIVE REPORT    Raffi Sampson  MR#: [id number]  : 1970  ACCOUNT #: [de-identified]   DATE OF SERVICE: 2018    PREOPERATIVE DIAGNOSES:  1. Left ovarian cyst.  2.  Left lower quadrant pain. POSTOPERATIVE DIAGNOSES:  1. Left ovarian neoplasm. 2.  Left lower quadrant pain. PROCEDURES PERFORMED:  1. Laparoscopic left oophorectomy including ovarian neoplasm and extensive lysis of adhesions. 2.  Cystoscopy. SURGEON:  Shonda Palencia MD    ASSISTANT:  [facility]    ANESTHESIA:  General.    ANESTHESIOLOGIST:    Kim Wadsworth and Dana Huntley    ESTIMATED BLOOD LOSS:  20 mL. SPECIMENS REMOVED:    1. Left ovary including cystic neoplasm. 2.  Peritoneal washings for cytology. FINDINGS:  An 8 cm predominantly cystic left ovarian neoplasm densely adherent to the left retroperitoneal structures including the left ureter and also densely adherent to the vaginal cuff and left pelvic sidewall. Filmy adhesions of the sigmoid colon to the mass. Normal appearing right ovary. Normal appearing appendix. Normal visible colon and small bowel. Normal appearing liver, normal appearing stomach. COMPLICATIONS:  None. IMPLANTS:  none    INDICATIONS:  The patient is a 48-year-old  1, para 1 -American female referred by Dr. Che Nunn for left lower quadrant pain and 8 cm cystic left pelvic mass. The patient is status post robotic-assisted total laparoscopic hysterectomy and bilateral salpingectomy on 2015 for symptomatic uterine fibroids. Patient has had left lower quadrant pain, pressure and heaviness that has been present since her hysterectomy, but has progressively gotten worse. Transvaginal ultrasound showed a cystic pelvic mass measuring 8 cm. CA-125 was normal at 5.5. Patient presents for surgical evaluation and treatment. Risks, benefits and alternatives of surgery reviewed with the patient. The patient decided to proceed and written consent was obtained. DESCRIPTION OF PROCEDURE:  After the patient was accurately identified, consent was verified signed on the chart and all questions were answered. The patient was taken to the operating room with IV running. She was placed in the supine position. Sequential compression devices were placed in the bilateral lower extremities and were functioning prior to induction of anesthesia. Two grams of Mefoxin was given intravenously as prophylactic antibiotics within 1 hour prior to surgery. Patient underwent dosing of general endotracheal anesthesia, was placed in the low dorsal lithotomy position in Pollyann Hanna stirrups and prepped and draped in normal fashion using ChloraPrep on the abdomen and Betadine in the perineum and vagina. A Abraham catheter was placed via the urethra and left to gravity drainage. A sponge stick was placed in the vagina. A surgical timeout was performed and all OR staff present and participated including myself and the anesthesia provider. 0.5% Marcaine with epinephrine was injected in the skin prior to incisions. A 5 mm skin incision was made in the left upper quadrant, a 5 mm tissue  atraumatic trocar was placed directly into the peritoneal cavity. Once peritoneal access was verified, the abdomen was insufflated with CO2 gas. Patient was placed in Trendelenburg. Three additional trocars were placed, a 5 mm trocar in the left lower quadrant, 5 mm trocar in the right lower quadrant and a 12 mm trocar in the suprapubic region. These trocars were placed under direct visualization. Complete pelvic and abdominal exploration performed. Findings were mentioned above. Careful lysis of adhesions was performed with the Harmonic scalpel to separate the sigmoid colon from the left pelvic sidewall and to expose the cystic left ovarian neoplasm.   The ovary with the neoplasm were densely adherent to the left retroperitoneum including the left ureter. The peritoneum was incised. The ureter was carefully dissected away from the mass. The ovarian vessels were identified, skeletonized, cauterized with bipolar cautery, then ligated and transected with the Harmonic scalpel. Careful lysis of adhesions were performed to completely separate the mass from the pelvic sidewall and the vagina. During dissection, a small colpotomy was performed and this was closed with 2-0 Vicryl suture laparoscopically. The ovary including neoplasm was placed in a specimen retrieval bag and removed via the suprapubic incision. Incision was enlarged to accommodate removal of the mass. The mass was sent to pathology for permanent pathologic evaluation. Of note, peritoneal washings were also obtained and sent to pathology for permanent pathologic evaluation. The fascia at the suprapubic site was reapproximated with 2-0 Vicryl suture. The pelvis was copiously irrigated and aspirated. The surgical site was reexamined and hemostatic. Air was allowed to escape from the abdomen. All trocars were removed and all skin incisions were closed with 4-0 Monocryl. Dermabond was placed as a dressing. A cystourethroscopy was performed. A 70-degree cystoscope with 17-Danish sheath was easily passed into the urethra. The mucosa was visualized. Bladder mucosa visualized. Active jets of urine were seen from the right and left ureteral orifices. There were no abnormalities in the bladder. The bladder was drained. The cystoscope was removed. Patient was replaced in the supine position, awakened from anesthesia, extubated, transferred to the hospital bed and transferred to the PACU awake in stable condition. All sharp, instrument, sponge counts were correct. There were no complications with his procedure.       Angélica Tello MD DMD / [place]  D: 08/28/2018 18:54     T: 08/28/2018 22:46  JOB #: [id number]

## 2018-08-29 NOTE — PERIOP NOTES
Pt left stable with family. Folder with discharge instruction and prescription given. Arm band shredded. IV access discontinue. Dressing CDI. No further questions. End of PeriOp care.    Visit Vitals    /66    Pulse 72    Temp 98.2 °F (36.8 °C)    Resp 16    Ht 5' (1.524 m)    Wt 73.9 kg (163 lb)    SpO2 98%    BMI 31.83 kg/m2

## 2018-08-29 NOTE — ANESTHESIA POSTPROCEDURE EVALUATION
Post-Anesthesia Evaluation and Assessment Cardiovascular Function/Vital Signs Visit Vitals  /54  Pulse 72  Temp 37.5 °C (99.5 °F)  Resp 13  Ht 5' (1.524 m)  Wt 73.9 kg (163 lb)  SpO2 100%  BMI 31.83 kg/m2 Patient is status post Procedure(s): LAPAROSCOPIC - LEFT  OOPHORECTOMY - LYSES OF ADHESIONS 
CYSTOSCOPY,   . Nausea/Vomiting: Controlled. Postoperative hydration reviewed and adequate. Pain: 
Pain Scale 1: FLACC (08/28/18 2010) Pain Intensity 1: 0 (08/28/18 2010) Managed. Neurological Status:  
Neuro (WDL): Within Defined Limits (08/28/18 2010) At baseline. Mental Status and Level of Consciousness: Arousable. Pulmonary Status:  
O2 Device: Room air (08/28/18 1940) Adequate oxygenation and airway patent. Complications related to anesthesia: None Post-anesthesia assessment completed. No concerns. Patient has met all discharge requirements. Signed By: Jasen Andrade MD  
 August 28, 2018

## 2018-09-12 PROBLEM — D27.9 SEROUS CYSTADENOMA OF OVARY: Status: ACTIVE | Noted: 2018-09-12

## 2022-03-14 ENCOUNTER — TRANSCRIBE ORDER (OUTPATIENT)
Dept: SCHEDULING | Age: 52
End: 2022-03-14

## 2022-03-14 DIAGNOSIS — R10.2 SUPRAPUBIC PAIN: Primary | ICD-10-CM

## 2022-04-01 ENCOUNTER — HOSPITAL ENCOUNTER (OUTPATIENT)
Dept: CT IMAGING | Age: 52
Discharge: HOME | End: 2022-04-01
Attending: NURSE PRACTITIONER
Payer: COMMERCIAL

## 2022-04-01 ENCOUNTER — HOSPITAL ENCOUNTER (EMERGENCY)
Age: 52
Discharge: LEFT BEFORE DISCHARGE | End: 2022-04-01
Attending: EMERGENCY MEDICINE
Payer: COMMERCIAL

## 2022-04-01 VITALS
HEIGHT: 60 IN | BODY MASS INDEX: 34.16 KG/M2 | OXYGEN SATURATION: 98 % | TEMPERATURE: 98.6 F | HEART RATE: 96 BPM | WEIGHT: 174 LBS | SYSTOLIC BLOOD PRESSURE: 120 MMHG | DIASTOLIC BLOOD PRESSURE: 66 MMHG | RESPIRATION RATE: 14 BRPM

## 2022-04-01 DIAGNOSIS — R10.2 SUPRAPUBIC PAIN: ICD-10-CM

## 2022-04-01 PROCEDURE — 74011000250 HC RX REV CODE- 250: Performed by: RADIOLOGY

## 2022-04-01 PROCEDURE — 74011000636 HC RX REV CODE- 636: Performed by: RADIOLOGY

## 2022-04-01 PROCEDURE — 75810000275 HC EMERGENCY DEPT VISIT NO LEVEL OF CARE

## 2022-04-01 PROCEDURE — 74178 CT ABD&PLV WO CNTR FLWD CNTR: CPT

## 2022-04-01 RX ORDER — BARIUM SULFATE 20 MG/ML
900 SUSPENSION ORAL
Status: COMPLETED | OUTPATIENT
Start: 2022-04-01 | End: 2022-04-01

## 2022-04-01 RX ADMIN — IOPAMIDOL 100 ML: 612 INJECTION, SOLUTION INTRAVENOUS at 15:07

## 2022-04-01 RX ADMIN — BARIUM SULFATE 900 ML: 20 SUSPENSION ORAL at 15:06

## 2022-04-01 NOTE — ED TRIAGE NOTES
Ambulatory patient arrives with complaints of waking up and feeling nauseated, vomited 5 times, was scheduled for a body scan today here at [facility], c/o left abdominal pain now

## (undated) DEVICE — TRAY PREP DRY W/ PREM GLV 2 APPL 6 SPNG 2 UNDPD 1 OVERWRAP

## (undated) DEVICE — 40580 - THE PINK PAD - ADVANCED TRENDELENBURG POSITIONING KIT: Brand: 40580 - THE PINK PAD - ADVANCED TRENDELENBURG POSITIONING KIT

## (undated) DEVICE — NEEDLE HYPO 22GA L1.5IN BLK S STL HUB POLYPR SHLD REG BVL

## (undated) DEVICE — LAP CHOLE: Brand: MEDLINE INDUSTRIES, INC.

## (undated) DEVICE — REM POLYHESIVE ADULT PATIENT RETURN ELECTRODE: Brand: VALLEYLAB

## (undated) DEVICE — TRAY CATH 16FR DRN BG LF -- CONVERT TO ITEM 363158

## (undated) DEVICE — SOLUTION INJ 1000ML ST H2O FLX CONT

## (undated) DEVICE — SHEARS ENDOSCP L36CM DIA5MM ULTRASONIC CRV TIP W/ ADV

## (undated) DEVICE — SUTURE MCRYL SZ 4-0 L27IN ABSRB UD L19MM PS-2 1/2 CIR PRIM Y426H

## (undated) DEVICE — SOLUTION IRRIGATION H2O 0797305] ICU MEDICAL INC]

## (undated) DEVICE — SUT VCRL + 2-0 27IN SH UD --

## (undated) DEVICE — BAG SPEC RETRV 275ML 10ML DISPOSABLE RELIACATCH

## (undated) DEVICE — APPLICATOR BNDG 1MM ADH PREMIERPRO EXOFIN

## (undated) DEVICE — DRAPE TWL SURG 16X26IN BLU ORB04] ALLCARE INC]

## (undated) DEVICE — 4-PORT MANIFOLD: Brand: NEPTUNE 2

## (undated) DEVICE — STERILE POLYISOPRENE POWDER-FREE SURGICAL GLOVES: Brand: PROTEXIS

## (undated) DEVICE — D&C HYSTEROSCOPY: Brand: MEDLINE INDUSTRIES, INC.

## (undated) DEVICE — FORCEPS BPLR DIA5MM MACRO JAW LAP ENDOPATH

## (undated) DEVICE — MATERNITY PAD,HEAVY: Brand: CURITY

## (undated) DEVICE — TROCAR ENDOSCP L100MM DIA12MM STBL SL BLDELSS ENDOPATH XCEL

## (undated) DEVICE — Z INACTIVE USE 2527070 DRAPE SURG W40XL44IN UNDERBUTTOCK SMS POLYPR W/ PCH BK DISP

## (undated) DEVICE — SOLUTION IRRIG 3000ML LAC R FLX CONT

## (undated) DEVICE — 3M™ IOBAN™ 2 ANTIMICROBIAL INCISE DRAPE 6650EZ: Brand: IOBAN™ 2

## (undated) DEVICE — SHEET,DRAPE,70X85,STERILE: Brand: MEDLINE

## (undated) DEVICE — LEGGINGS, PAIR, 31X48, STERILE: Brand: MEDLINE

## (undated) DEVICE — DISPOSABLE SUCTION/IRRIGATOR TUBE SET WITH TIP: Brand: AHTO

## (undated) DEVICE — TROCAR LAP L100MM DIA5MM BLDELSS W/ STBL SL ENDOPATH XCEL

## (undated) DEVICE — KENDALL SCD EXPRESS SLEEVES, KNEE LENGTH, MEDIUM: Brand: KENDALL SCD